# Patient Record
Sex: MALE
[De-identification: names, ages, dates, MRNs, and addresses within clinical notes are randomized per-mention and may not be internally consistent; named-entity substitution may affect disease eponyms.]

---
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
## 2023-03-20 ENCOUNTER — TELEPHONE (OUTPATIENT)
Facility: CLINIC | Age: 18
End: 2023-03-20

## 2023-03-20 NOTE — TELEPHONE ENCOUNTER
1st reminder letter sent to patient           HEPATIC FUNCTION PANEL (7) [[id number]]      [address] [facility], DIRECT SCREEN [[id number]]

## 2023-05-31 ENCOUNTER — TELEPHONE (OUTPATIENT)
Facility: CLINIC | Age: 18
End: 2023-05-31

## 2023-05-31 NOTE — TELEPHONE ENCOUNTER
2nd,ovedue reminder letter sent out to patient     [facility], DIRECT SCREEN   HEPATIC FUNCTION PANEL (7)

## (undated) DIAGNOSIS — K64.8 INTERNAL HEMORRHOIDS: ICD-10-CM

## (undated) DIAGNOSIS — R19.7 DIARRHEA, UNSPECIFIED TYPE: Primary | ICD-10-CM

## (undated) DIAGNOSIS — K59.00 CONSTIPATION, UNSPECIFIED CONSTIPATION TYPE: ICD-10-CM

## (undated) DIAGNOSIS — R74.8 ELEVATED LIVER ENZYMES: Primary | ICD-10-CM

## (undated) NOTE — Clinical Note
Name:  Meghna Rico School Year:  6th Grade Class: Student ID No.:   Address:  [facility] [address] [address] Phone:  [phone] (home) [phone] (work) :  13year old   Name Relationship Lgl Jessu Wade 3 Work Code Climate syndrome, arrhythmogenic right ventricular cardiomyopathy, long QT syndrome, short QT syndrome, Brugada syndrome, or catecholaminergic polymorphic ventricular tachycardia?      13. Does anyone in your family have a heart problem, pacemaker, or implanted def 39. Do you have a history of seizure disorder?     37. Do you have headaches with exercise? 38. Have you ever had numbness, tingling, or weakness in your arms or legs after being hit or falling?      39.Have you ever been unable to move your arms / legs excavatum,      arachnodactyly, arm span > height, hyperlaxity, myopia, MVP, aortic insufficiency) Yes    Eyes/Ears/Nose/Throat:  Pupils equal    Hearing Yes    Lymph nodes Yes    Heart*  · Murmurs (auscultation standing, supine, +/- Valsalva)  · Location Protocol.  We have reviewed the policy and understand that I/our student may be asked to submit to testing for the presence of performance-enhancing substances in my/his/her body either during [employer] state series events or during the school day, and I/our patience

## (undated) NOTE — LETTER
[facility] [employer] of Child Health Examination       Student's Name  Hank Earl D verifying above immunization history must sign below.   Signature                        Title            MD               Date  12/20/2019   Signature HEALTH HISTORY          TO BE COMPLETED AND SIGNED BY PARENT/GUARDIAN AND VERIFIED BY HEALTH CARE PROVIDER    ALLERGIES  (Food, drug, insect, other)  Patient has no known allergies.  MEDICATION  (List all prescribed or taken on a regular basis.)     Diagnos /68 (BP Location: Right arm, Patient Position: Sitting, Cuff Size: adult)   Pulse 69   Ht 5' 11\" (1.803 m)   Wt 87.5 kg (193 lb)   BMI 26.92 kg/m²     DIABETES SCREENING  BMI>85% age/sex  Yes  And any two of the following:  Family History No    Zenobia Like Respiratory Yes                   Diagnosis of Asthma: No Mental Health Yes        Currently Prescribed Asthma Medication:            Quick-relief  medication (e.g. Short Acting Beta Antagonist): No          Controller medication (e.g. inhaled corticostero

## (undated) NOTE — LETTER
VACCINE ADMINISTRATION RECORD  PARENT / GUARDIAN APPROVAL  Date: 2019  Vaccine administered to: Camilla Gilmore     : 2005    MRN: [id number]    A copy of the appropriate Centers for Disease Control and Prevention Vaccine Information statemen

## (undated) NOTE — LETTER
Name:  Aury Tyler School Year:  10th Grade Class: Student ID No.:   Address:  [facility] [address] [address] Phone:  [phone] (home) [phone] (work) :  11year old   Name Relationship Lgl [address] Work Phone Home Phone Mobile Phone polymorphic ventricular tachycardia? 13. Does anyone in your family have a heart problem, pacemaker, or implanted defibrillator? 12. Has anyone in your family had unexplained fainting, seizures, or near drowning?      BONE AND JOINT QUESTIONS Yes No 38. Have you ever had numbness, tingling, or weakness in your arms or legs after being hit or falling? 39.Have you ever been unable to move your arms / legs after being hit /fall? 40. Have you ever become ill while exercising in the heat?     41.  D Eyes/Ears/Nose/Throat:  Pupils equal    Hearing Yes    Lymph nodes Yes    Heart*  · Murmurs (auscultation standing, supine, +/- Valsalva)  · Location of point of maximal impulse (PMI) Yes    Pulses Yes    Lungs Yes    Abdomen Yes    Genitourinary (males on defined in the [facility] Performance-Enhancing Substance Testing Program Protocol.  We have reviewed the policy and understand that I/our student may be asked to submit to testing for the presence of performance-enhancing substances in my/his/her body either dur

## (undated) NOTE — Clinical Note
State of [facility] [employer] of Child Health Examination       Student's Name  Aleena Earl Da Title                           Date    (If adding dates to the above immunization history section, put your initials by date(s) and sign here.)   ALTERNATIVE PROOF OF IMMUNITY   1 Diagnosis of asthma? Child wakes during the night coughing   Yes   No    Yes   No    Loss of function of one of paired organs? (eye/ear/kidney/testicle)   Yes   No      Birth Defects? Developmental delay? Yes   No    Yes   No  Hospitalizations? When? polycystic ovarian syndrome, acanthosis nigricans)           no                At Risk            no   Lead Risk Questionnaire  Req'd for children 6 months thru 6 yrs enrolled in licensed or public school operated day care, ,  nursery school and/o SPECIAL INSTRUCTIONS/DEVICES e.g. safety glasses, glass eye, chest protector for arrhythmia, pacemaker, prosthetic device, dental bridge, false teeth, athleticsupport/cup     None   MENTAL HEALTH/OTHER   Is there anything else the school should know about

## (undated) NOTE — LETTER
?   PREPARTICIPATION PHYSICAL EVALUATION  MEDICAL ELIGIBILITY FORM  [x] Medically eligible for all sports without restrictions   [] Medically eligible for all sports without restriction with recommendations for further evaluation or treatment     []Medicall e)      Muscle or body aches Yes  No    f)       Headache Yes  No    g)      New loss of taste or smell Yes  No    h)      Sore throat Yes  No    i)       Congestion or runny nose Yes  No    j)       Nausea or vomiting Yes  No    k)      Diarrhea Yes  No ([email or web address])  • COVID?19 Interim Guidance: Return to Sports and Physical Activity ([email or web address])    ? PREPARTICIPATION PHYSICAL EVALUATION   HISTORY FORM  Note: Complete and sign this form (with your parents if younger than 25) before your appointment.   Name: Jaime Mckinney for any reason? [] [] 3. Do you have any ongoing medical issues or recent illnesses? [] []   HEART HEALTH QUESTIONS ABOUT YOU Yes No 4. Have you ever passed out or nearly passed out during or after exercise? [] [] 5.    Have you ever had discomfort, pa exercise? [] []   17. Are you missing a kidney, an eye, a testicle (males), your spleen, or any other organ? [] []   18. Do you have groin or testicle pain or a painful bulge or hernia in the groin area? [] []   19.    Do you have any recurring skin dionte hereby state that, to the best of my knowledge, my answers to the questions on this form are complete and correct.     Signature of athlete:____________________________________________________________________________________________  Signature of parent or methicillin-resistant Staphylococcus aureus (MRSA), or tinea corporis   [x]  []   Neurological   [x]  []   MUSCULOSKELETAL NORMAL ABNORMAL FINDINGS   Neck   [x]  []    Back   [x]  []   Shoulder and arm   [x]  []     Elbow and forearm   [x]  []     Wrist, h

## (undated) NOTE — LETTER
3/13/2018              [address] [place] [address]         To Whom it may concern: This is to certify that Muriel Grayson had an appointment on 3/13/2018 with Wicho Moctezuma.  Evon Mtz MD.        Maurice Velasquez

## (undated) NOTE — LETTER
Name:  Brandon Reading Year:  11th Grade Class: Student ID No.:   Address:  [facility] [address] [address] Phone:  [phone] (home) [phone] (work) :  10year old   Name Relationship [address] Work Phone Home Phone Mobile Phone 12. Has anyone in your family had unexplained fainting, seizures, or near drowning? BONE AND JOINT QUESTIONS Yes No   17. Have you ever had an injury to a bone, muscle, ligament, or tendon that caused you to miss a practice or a game?      18. Have you /fall?     36. Have you ever become ill while exercising in the heat?     41. Do you get frequent muscle cramps when exercising? 42. Do you or someone in your family have sickle cell trait or disease? 43.  Have you ever had any problems with your ey · Location of point of maximal impulse (PMI) Yes    Pulses Yes    Lungs Yes    Abdomen Yes    Genitourinary (males only)* Yes    Skin:  HSV, lesions suggestive of MRSA, tinea corporis Yes    Neurologic* Yes    MUSCULOSKELETAL     Neck Yes    Back Yes    Sh performance-enhancing substances in my/his/her body either during [employer] state [employer] events or during the school day, and I/our student do/does hereby agree to submit to such testing and analysis by a certified laboratory.  We further understand and agree th

## (undated) NOTE — LETTER
[facility] [employer] Child Health Examination       Student's Name  Jagdish EDMONDSON Health care provider (MD, DO, APN, PA , school health professional) verifying above immunization history must sign below.   Signature            Title                           Date  12/23/2020   Signature 7/28/2005  Sex  Male School   Grade Level/ID#     HEALTH HISTORY          TO BE COMPLETED AND SIGNED BY PARENT/GUARDIAN AND VERIFIED BY HEALTH CARE PROVIDER    ALLERGIES  (Food, drug, insect, other)  Patient has no known allergies.  MEDICATION  (List all pr /71   Pulse 81   Ht 6' 0.5\"   Wt 71.7 kg (158 lb)   BMI 21.13 kg/m²     DIABETES SCREENING  BMI>85% age/sex  No And any two of the following:  Family History No    Ethnic Minority  No          Signs of Insulin Resistance (hypertension, dyslipidemia, Currently Prescribed Asthma Medication:            Quick-relief  medication (e.g. Short Acting Beta Antagonist): No          Controller medication (e.g. inhaled corticosteroid):   No Other   NEEDS/MODIFICATIONS required in the school setting  None DIET

## (undated) NOTE — LETTER
[facility] [employer] Child [employer] Examination       Student's Name  [place] Birth D immunization history must sign below.   Signature     Title      MD           Date  1/26/2022   Signature                                                                                                                                              Title PARENT/GUARDIAN AND VERIFIED BY HEALTH CARE PROVIDER    ALLERGIES  (Food, drug, insect, other)  Patient has no known allergies. MEDICATION  (List all prescribed or taken on a regular basis.)     Diagnosis of asthma?   Child wakes during the night coughing age/sex  No And any two of the following:  Family History No    Ethnic Minority  No          Signs of Insulin Resistance (hypertension, dyslipidemia, polycystic ovarian syndrome, acanthosis nigricans)    No           At Risk  No   Lead Risk Questionnaire Controller medication (e.g. inhaled corticosteroid):   No Other   NEEDS/MODIFICATIONS required in the school setting  None DIETARY Needs/Restrictions     None   SPECIAL INSTRUCTIONS/DEVICES e.g. safety glasses, glass eye, chest protector for arrhythmia,

## (undated) NOTE — LETTER
5/31/2023              [address] [place] [address]         Dear Juany Baca,    [facility] records indicate that the tests ordered for you by Osvaldo Chavez. Yonis Carr  have not been done. If you have, in fact, already completed the tests or you do not wish to have the tests done, please contact our office at [address] [place] [address]. Otherwise, please proceed with the testing. Enclosed is a duplicate order for your convenience. Labs:    BHARATI, DIRECT SCREEN  HEPATIC FUNCTION PANEL (7)    Sincerely,    Osvaldo Chavez.  Yonis Carr  [facility] [address], [address] [address] [phone]

## (undated) NOTE — LETTER
3/20/2023              [facility]         Dear Belinda Cantrell,    [facility] records indicate that the tests ordered for you by Lulu Reeder. Barry Jaimes  have not been done. If you have, in fact, already completed the tests or you do not wish to have the tests done, please contact our office at [address] [place] [address]. Otherwise, please proceed with the testing. Enclosed is a duplicate order for your convenience. Labs:    Merl Lean SCREEN (Order #[id number]) on 6/14/22    HEPATIC FUNCTION PANEL (7) (Order #[id number]) on 9/1/22      Sincerely,    Lulu Reeder.  [facility] [address], [address] [address] [phone]

## (undated) NOTE — LETTER
[facility] [employer] Examination       Student's Name  Alin Earl D Signature                                                                                                                                   Title                           Date     Signature Male School   Grade Level/ID#  11th Grade   HEALTH HISTORY          TO BE COMPLETED AND SIGNED BY PARENT/GUARDIAN AND VERIFIED BY HEALTH CARE PROVIDER    ALLERGIES  (Food, drug, insect, other)  Patient has no known allergies.  MEDICATION  (List all prescrib PHYSICAL EXAMINATION REQUIREMENTS    Entire section below to be completed by MD/DO/APN/PA       PHYSICAL EXAMINATION REQUIREMENTS (head circumference if <33 years old):   /64   Ht 5' 10\" (1.778 m)   Wt 74 kg (163 lb 2.4 oz)   BMI 23.41 kg/m²     DI Mouth/Dental Yes  Spinal examination Yes    Cardiovascular/HTN Yes  Nutritional status Yes    Respiratory Yes                   Diagnosis of Asthma: No Mental Health Yes        Currently Prescribed Asthma Medication:            Quick-relief  medication (e.

## (undated) NOTE — LETTER
[facility] [employer] Child [employer] Examination       Student's Name  Nathanael Funez Birth D Signature                                                                                                                                   Title             MD              Date  12/28/2018   Signature Male School   Grade Level/ID#  11th Grade   HEALTH HISTORY          TO BE COMPLETED AND SIGNED BY PARENT/GUARDIAN AND VERIFIED BY HEALTH CARE PROVIDER    ALLERGIES  (Food, drug, insect, other)  Patient has no known allergies.  MEDICATION  (List all prescrib PHYSICAL EXAMINATION REQUIREMENTS    Entire section below to be completed by MD/DO/APN/PA       PHYSICAL EXAMINATION REQUIREMENTS (head circumference if <33 years old):   /64   Ht 5' 10\" (1.778 m)   Wt 74 kg (163 lb 2.4 oz)   BMI 23.41 kg/m²     DI Mouth/Dental Yes  Spinal examination Yes    Cardiovascular/HTN Yes  Nutritional status Yes    Respiratory Yes                   Diagnosis of Asthma: No Mental Health Yes        Currently Prescribed Asthma Medication:            Quick-relief  medication (e.

## (undated) NOTE — MR AVS SNAPSHOT
[place]  [address]  [phone]               Thank you for choosing us for your health care visit with Samira Gomez.  Aleena Mosquera MD.  We are glad to serve you and happy to provide you with this summ with others in the family? Is he or she respectful of you, other adults, and authority? Does your child participate in family events, or does he or she withdraw from other family members? · Risky behaviors.  It’s not too early to start talking to your chil erections and “wet dreams” begin to occur. Reassure your son that this is normal.  · Emotional changes. Along with these physical changes, you’ll likely notice changes in your child’s personality.  You may notice your child developing an interest in dating · Pay attention to portions. Serve portions that make sense for your kids. Let them stop eating when they’re full—don’t make them clean their plates. Be aware that many kids’ appetites increase during puberty.  If your child is still hungry after a meal, of · In the car, all children younger than 13 should sit in the back seat. Children shorter than 4'9\" (57 inches) should continue to use a booster seat to properly position the seat belt.   · If your child has a cell phone or portable music player, make sure · Set limits for the use of cell phones, the computer, and the Internet. Remind your child that you can check the web browser history and cell phone logs to know how these devices are being used.  Use parental controls and passwords to block access to [employer] * Dexmethylphenidate HCl 5 MG Tabs   Commonly known as:  FOCALIN           * Dexmethylphenidate HCl ER 20 MG Cp24   TAKE ONE CAPSULE BY MOUTH EVERY MORNING   Commonly known as:  FOCALIN XR           * Notice:   This list has 3 medication(s) that are the [facility] activity the norm for their family.   o Create a home where healthy choices are available and encouraged  o Make it fun – find ways to engage your children such as:  o playing a game of tag  o cooking healthy meals together  o creating a [employer] shopping li